# Patient Record
Sex: MALE
[De-identification: names, ages, dates, MRNs, and addresses within clinical notes are randomized per-mention and may not be internally consistent; named-entity substitution may affect disease eponyms.]

---

## 2022-07-04 ENCOUNTER — NON-APPOINTMENT (OUTPATIENT)
Age: 42
End: 2022-07-04

## 2022-09-08 ENCOUNTER — APPOINTMENT (OUTPATIENT)
Dept: ORTHOPEDIC SURGERY | Facility: CLINIC | Age: 42
End: 2022-09-08

## 2022-09-08 VITALS — HEIGHT: 70 IN | BODY MASS INDEX: 27.92 KG/M2 | WEIGHT: 195 LBS

## 2022-09-08 DIAGNOSIS — Z78.9 OTHER SPECIFIED HEALTH STATUS: ICD-10-CM

## 2022-09-08 DIAGNOSIS — Z00.00 ENCOUNTER FOR GENERAL ADULT MEDICAL EXAMINATION W/OUT ABNORMAL FINDINGS: ICD-10-CM

## 2022-09-08 DIAGNOSIS — M65.30 TRIGGER FINGER, UNSPECIFIED FINGER: ICD-10-CM

## 2022-09-08 PROCEDURE — 73130 X-RAY EXAM OF HAND: CPT | Mod: LT

## 2022-09-08 PROCEDURE — 99213 OFFICE O/P EST LOW 20 MIN: CPT

## 2022-09-08 NOTE — ASSESSMENT
[FreeTextEntry1] : The condition was explained to the patient.\par Discussed risks and benefits of treatment options for tenosynovitis - activity modification, NSAID, splint, steroid injection, or surgery.\par \par Patient declined CSI.\par - recommend moist heat, activity modification, oral NSAID PRN.\par \par F/u PRN.

## 2022-09-08 NOTE — HISTORY OF PRESENT ILLNESS
[2] : 2 [1] : 2 [Constant] : constant [Full time] : Work status: full time [de-identified] : 9/8/22: 40yo KYLEE MASON ( for [employer]) presents for LEFT hand soreness for the last 3 weeks. Localized to MPJ and PIPJ of index, middle, ring, small fingers - worse to middle/ring fingers. Only occurs with use of hand. Denies injury. Denies numbness/tingling.\par \par Hx: none. 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

## 2022-09-08 NOTE — IMAGING
[Left] : left hand [de-identified] : LEFT HAND\par skin intact. no swelling.\par + TTP to IF/MF A1 pulley > thumb, RF A1 pulley. min TTP to SF A1 pulley, MF/RF PIPJ.\par palpable nodule over MF A1 pulley.\par good wrist extension, flexion. good pronation, supination.\par good EPL, FPL. good finger extension, flex to full fist. good finger abduction and adduction. \par SILT to median, ulnar, radial distribution. \par palpable radial pulse, brisk cap refill all digits.\par no triggering.\par negative Tinel's at carpal tunnel. [FreeTextEntry1] : multiple calcifications volar to SF MPJ. no acute displaced fracture or dislocation.